# Patient Record
Sex: FEMALE | Race: WHITE | NOT HISPANIC OR LATINO | Employment: FULL TIME | URBAN - METROPOLITAN AREA
[De-identification: names, ages, dates, MRNs, and addresses within clinical notes are randomized per-mention and may not be internally consistent; named-entity substitution may affect disease eponyms.]

---

## 2024-05-15 ENCOUNTER — HOSPITAL ENCOUNTER (EMERGENCY)
Facility: MEDICAL CENTER | Age: 59
End: 2024-05-15
Attending: EMERGENCY MEDICINE
Payer: COMMERCIAL

## 2024-05-15 ENCOUNTER — APPOINTMENT (OUTPATIENT)
Dept: RADIOLOGY | Facility: MEDICAL CENTER | Age: 59
End: 2024-05-15
Attending: EMERGENCY MEDICINE
Payer: COMMERCIAL

## 2024-05-15 VITALS
HEART RATE: 74 BPM | WEIGHT: 180.78 LBS | HEIGHT: 65 IN | RESPIRATION RATE: 18 BRPM | DIASTOLIC BLOOD PRESSURE: 88 MMHG | TEMPERATURE: 97.6 F | BODY MASS INDEX: 30.12 KG/M2 | SYSTOLIC BLOOD PRESSURE: 131 MMHG | OXYGEN SATURATION: 96 %

## 2024-05-15 DIAGNOSIS — M54.50 ACUTE MIDLINE LOW BACK PAIN WITHOUT SCIATICA: ICD-10-CM

## 2024-05-15 DIAGNOSIS — J02.9 PHARYNGITIS, UNSPECIFIED ETIOLOGY: ICD-10-CM

## 2024-05-15 LAB — S PYO DNA SPEC NAA+PROBE: NOT DETECTED

## 2024-05-15 RX ORDER — CYCLOBENZAPRINE HCL 10 MG
10 TABLET ORAL 3 TIMES DAILY PRN
Qty: 15 TABLET | Refills: 0 | Status: SHIPPED | OUTPATIENT
Start: 2024-05-15

## 2024-05-15 RX ORDER — METHYLPREDNISOLONE 4 MG/1
TABLET ORAL
Qty: 1 EACH | Refills: 0 | Status: SHIPPED | OUTPATIENT
Start: 2024-05-15

## 2024-05-15 ASSESSMENT — PAIN DESCRIPTION - PAIN TYPE: TYPE: ACUTE PAIN

## 2024-05-15 NOTE — Clinical Note
Lisette Tracy was seen and treated in our emergency department on 5/15/2024.  She may return to work on 05/22/2024.       If you have any questions or concerns, please don't hesitate to call.      Toy Eli M.D.

## 2024-05-15 NOTE — ED NOTES
Patient brought in from [facility] to [place] [address]. Reviewed and agree with triage note.    Patient awake and alert. Reports medial lower back pain starting yesterday. Denies pain with urination or known injury to back. Also reports sore throat x4 months. Denies fever or other symptoms. No swelling noted to back of throat, skin PWD, respirations even and unlabored, MMM.   Call light in reach, chart up for ERP. Gown provided.

## 2024-05-15 NOTE — ED NOTES
"Lisette Tracy has been discharged from the Emergency Room.    Discharge instructions, which include signs and symptoms to monitor patient for, as well as detailed information regarding acute midline low back pain, pharyngitis provided.  All questions and concerns addressed at this time.      Follow up with ENT and neurosurgery encouraged, office number provided.     Prescription for Flexeril and Methylprednisolone provided, education provided on proper administration.     Patient leaves ER in no apparent distress. This RN provided education regarding returning to the ER for any new concerns or changes in patient's condition.      /88   Pulse 74   Temp 36.4 °C (97.6 °F) (Temporal)   Resp 18   Ht 1.651 m (5' 5\")   Wt 82 kg (180 lb 12.4 oz)   SpO2 96%   BMI 30.08 kg/m²    "

## 2024-05-15 NOTE — DISCHARGE INSTRUCTIONS
Regarding back pain, abnormal x-ray, you have been referred to spine surgery, please make appointment    Regarding 4 months of throat pain, workup for strep in the ER is negative.  Make appointment please with ear nose throat on-call for follow-up.

## 2024-05-15 NOTE — ED TRIAGE NOTES
"Pt arrives to triage ambulatory for  Chief Complaint   Patient presents with    Back Pain     Starting last night. Lower back. Pain is worse while sitting. Pt reports she took a bath last night and ibuprofen to help with no relief.     Sore Throat     x 4 months     Denies any recent falls or trauma. Pain is 7/10 dull and aching. GCS=15.    BP (!) 146/99   Pulse 79   Temp 36.1 °C (97 °F) (Temporal)   Resp 18   Ht 1.651 m (5' 5\")   Wt 82 kg (180 lb 12.4 oz)   SpO2 95%   BMI 30.08 kg/m²     "

## 2024-05-15 NOTE — ED PROVIDER NOTES
"ED Provider Note    CHIEF COMPLAINT  Chief Complaint   Patient presents with    Back Pain     Starting last night. Lower back. Pain is worse while sitting. Pt reports she took a bath last night and ibuprofen to help with no relief.     Sore Throat     x 4 months         HPI/ROS  LIMITATION TO HISTORY   Select: : None  OUTSIDE HISTORIAN(S):  None    Lisette Tracy is a 58 y.o. female who presents with 2 complaints.  Patient states she has had painful scratchy throat for the last 4 months.  No difficulty swallowing or breathing.  She denies injury.  She denies this being related to seasonal allergies.  Second complaint is low midline lumbar spine.  No bowel or bladder incontinence, no saddle paresthesias.  No acute numbness or weakness.  She denies history of back problems.  She has a physical job but denies any fall or injury at work.  No associated abdominal pain.  She states she stopped having menstrual cycles 2 years ago.    PAST MEDICAL HISTORY       SURGICAL HISTORY  patient denies any surgical history    FAMILY HISTORY  History reviewed. No pertinent family history.    SOCIAL HISTORY  Social History     Tobacco Use    Smoking status: Never    Smokeless tobacco: Never   Substance and Sexual Activity    Alcohol use: Yes     Comment: rarely    Drug use: Never    Sexual activity: Not on file       CURRENT MEDICATIONS  Home Medications       Reviewed by Cathi Mclaughlin R.N. (Registered Nurse) on 05/15/24 at 0647  Med List Status: Partial     Medication Last Dose Status   azithromycin (ZITHROMAX) 250 MG TABS  Active   ibuprofen (MOTRIN) 200 MG TABS  Active   polymixin-trimethoprim (POLYTRIM) 97023-9.1 UNIT/ML-% SOLN  Active                    ALLERGIES  Allergies   Allergen Reactions    Amoxicillin        PHYSICAL EXAM  VITAL SIGNS: BP (!) 146/99   Pulse 79   Temp 36.1 °C (97 °F) (Temporal)   Resp 18   Ht 1.651 m (5' 5\")   Wt 82 kg (180 lb 12.4 oz)   SpO2 95%   BMI 30.08 kg/m²    Musculoskeletal: Midline " lower lumbar tenderness.  Upper lumbar and thoracic spine are nontender.  Hips nontender.  Skin: No bruising or swelling.  No pilonidal cyst.  No shingles.  ENT: Posterior pharynx appears normal.  No intraoral lesions  Neurologic: Sensation normal, strength normal, speech clear.  She ambulates without assistance  Psychiatric: Normal mood  GI: Abdomen is soft and nontender  Cardiac: Regular rate, regular rhythm    EKG/LABS  Results for orders placed or performed during the hospital encounter of 05/15/24   POC Group A Strep, PCR   Result Value Ref Range    POC Group A Strep, PCR Not Detected Not Detected       I have independently interpreted this EKG    RADIOLOGY/PROCEDURES   I have independently interpreted the diagnostic imaging associated with this visit and am waiting the final reading from the radiologist.   My preliminary interpretation is as follows: Lumbar spine x-rays negative for acute fracture    Radiologist interpretation:  DX-LUMBAR SPINE-2 OR 3 VIEWS   Final Result      Moderate disc degeneration.      No acute abnormality.          COURSE & MEDICAL DECISION MAKING    ASSESSMENT, COURSE AND PLAN  Care Narrative: Patient presents with 2 complaints.  She has a chronic pharyngitis for 4 months and evidence of unknown etiology.  Strep infection has been ruled out with negative test.  Posterior pharynx does not appear abnormal at this time.  She has been referred to on-call ear nose throat for follow-up, further workup as indicated.    Patient complaining of low back pain over the last 24 hours, has evidence of degenerative disc disease on her x-ray.  The pain is atraumatic.  There are no neurologic deficits, no evidence of cauda equina syndrome.  Plan for Medrol Dosepak.  Flexeril for muscle spasm if needed.  She has been provided referral to spine surgery on-call, is advised to rest her back this weekend, following up with specialist if no improvement for further workup and care.        DISPOSITION AND  DISCUSSIONS    Escalation of care considered, and ultimately not performed:acute inpatient care management, however at this time, the patient is most appropriate for outpatient management    Barriers to care at this time, including but not limited to: Patient does not have established PCP.     Decision tools and prescription drugs considered including, but not limited to: Antibiotics were not indicated, no evidence of bacterial infection .    FINAL DIAGNOSIS  1. Acute midline low back pain without sciatica    2. Pharyngitis, unspecified etiology           Electronically signed by: Toy Eli M.D., 5/15/2024 7:23 AM